# Patient Record
Sex: FEMALE
[De-identification: names, ages, dates, MRNs, and addresses within clinical notes are randomized per-mention and may not be internally consistent; named-entity substitution may affect disease eponyms.]

---

## 2019-01-14 ENCOUNTER — HOSPITAL ENCOUNTER (OUTPATIENT)
Dept: HOSPITAL 88 - MAMMO | Age: 44
End: 2019-01-14
Attending: INTERNAL MEDICINE
Payer: COMMERCIAL

## 2019-01-14 DIAGNOSIS — Z12.31: Primary | ICD-10-CM

## 2019-01-14 PROCEDURE — 77067 SCR MAMMO BI INCL CAD: CPT

## 2019-01-15 NOTE — DIAGNOSTIC IMAGING REPORT
#[id number] - [id number]

#BILATERAL DIGITAL SCREENING MAMMOGRAM WITH CAD: 1/14/2019

CLINICAL: Routine screening.  



Comparison is made to exam dated:  11/13/2017 mammogram - [facility].  

Current study contains 4 films.  

There are scattered fibroglandular elements in both breasts.  

Current study was also evaluated with a Computer Aided Detection (CAD) system.  

There is a benign calcification in both breasts.  

No significant masses, calcifications, or other findings are seen in either breast.  

There has been no significant interval change.



IMPRESSION: BENIGN

There is no mammographic evidence of malignancy.  A 1 year screening mammogram is recommended. 

The patient will be notified by letter of the results.  





Akin chavarria/mansi:1/15/2019 10:25:39  



Imaging Technologist: Natalie ZELAYA)(M), [facility]

letter sent: Compared to Prior B9  

Mammogram BI-RADS: 2 Benign